# Patient Record
Sex: MALE | Race: WHITE | NOT HISPANIC OR LATINO | Employment: FULL TIME | URBAN - METROPOLITAN AREA
[De-identification: names, ages, dates, MRNs, and addresses within clinical notes are randomized per-mention and may not be internally consistent; named-entity substitution may affect disease eponyms.]

---

## 2022-02-03 ENCOUNTER — OFFICE VISIT (OUTPATIENT)
Dept: GASTROENTEROLOGY | Facility: CLINIC | Age: 52
End: 2022-02-03
Payer: COMMERCIAL

## 2022-02-03 DIAGNOSIS — Z12.11 SCREENING FOR MALIGNANT NEOPLASM OF COLON: ICD-10-CM

## 2022-02-03 DIAGNOSIS — K21.9 GASTROESOPHAGEAL REFLUX DISEASE WITHOUT ESOPHAGITIS: ICD-10-CM

## 2022-02-03 DIAGNOSIS — R10.11 RUQ PAIN: Primary | ICD-10-CM

## 2022-02-03 PROCEDURE — 99203 OFFICE O/P NEW LOW 30 MIN: CPT | Performed by: PHYSICIAN ASSISTANT

## 2022-02-03 NOTE — PROGRESS NOTES
[facility] - Outpatient Consultation  Manny Acosta 46 y o  male MRN: [id number]  Encounter: [id number]          ASSESSMENT AND PLAN:      1  RUQ pain  2  Screening for malignant neoplasm of colon  3  Gastroesophageal reflux disease without esophagitis  -Will plan EGD and colonoscopy with biopsy   -Will add Pepcid to patient's regimen   -Will plan hepatobiliary scan with CCK  -Follow-up in 6 weeks  ______________________________________________________________________    HPI:    70-year-old male presents the office today with a chief complaint of right upper quadrant abdominal pain and GERD  Patient reports he has been suffering with right upper quadrant abdominal pain for the last several weeks  Patient does suffer from chronic acid reflux  Patient has never had endoscopy or colonoscopy in the past   Patient most recently had a right upper quadrant ultrasound that showed no gallbladder pathology  Patient is on PPI therapy at this time  Patient has no alarm symptoms such as melena, hematemesis, unexplained weight loss  REVIEW OF SYSTEMS:    CONSTITUTIONAL: Denies any fever, chills, rigors, and weight loss  HEENT: No earache or tinnitus  Denies hearing loss or visual disturbances  CARDIOVASCULAR: No chest pain or palpitations  RESPIRATORY: Denies any cough, hemoptysis, shortness of breath or dyspnea on exertion  GASTROINTESTINAL: As noted in the History of Present Illness  GENITOURINARY: No problems with urination  Denies any hematuria or dysuria  NEUROLOGIC: No dizziness or vertigo, denies headaches  MUSCULOSKELETAL: Denies any muscle or joint pain  SKIN: Denies skin rashes or itching  ENDOCRINE: Denies excessive thirst  Denies intolerance to heat or cold  PSYCHOSOCIAL: Denies depression or anxiety  Denies any recent memory loss  Historical Information   No past medical history on file  No past surgical history on file    Social History   Social History     Substance and Sexual Activity   Alcohol Use Not on file     Social History     Substance and Sexual Activity   Drug Use Not on file     Social History     Tobacco Use   Smoking Status Not on file   Smokeless Tobacco Not on file     No family history on file  Meds/Allergies     No current outpatient medications on file  Not on File        Objective     There were no vitals taken for this visit  There is no height or weight on file to calculate BMI  PHYSICAL EXAM:      General Appearance:   Alert, cooperative, no distress   HEENT:   Normocephalic, atraumatic, anicteric      Neck:  Supple, symmetrical, trachea midline   Lungs:   Clear to auscultation bilaterally; no rales, rhonchi or wheezing; respirations unlabored    Heart[de-identified]   Regular rate and rhythm; no murmur, rub, or gallop  Abdomen:   Soft, non-tender, non-distended; normal bowel sounds; no masses, no organomegaly    Genitalia:   Deferred    Rectal:   Deferred    Extremities:  No cyanosis, clubbing or edema    Pulses:  2+ and symmetric    Skin:  No jaundice, rashes, or lesions    Lymph nodes:  No palpable cervical lymphadenopathy        Lab Results:   No visits with results within 1 Day(s) from this visit  Latest known visit with results is:   No results found for any previous visit  Radiology Results:   No results found

## 2022-02-03 NOTE — LETTER
February 3, 2022     Jodi Carpenter MD  [facility] [address] [facility]  [address]    Patient: David Sylvester   YOB: 1970   Date of Visit: 2/3/2022       Dear Dr Aldo Taveras: Thank you for referring David Sylvester to me for evaluation  Below are my notes for this consultation  If you have questions, please do not hesitate to call me  I look forward to following your patient along with you  Sincerely,        Ashley Polk PA-C        CC: No Recipients  Mayte Gracia  2/3/2022  5:07 PM  Sign when Signing Visit  [facility] - Outpatient Consultation  David Sylvester 46 y o  male MRN: [id number]  Encounter: [id number]          ASSESSMENT AND PLAN:      1  RUQ pain  2  Screening for malignant neoplasm of colon  3  Gastroesophageal reflux disease without esophagitis  -Will plan EGD and colonoscopy with biopsy   -Will add Pepcid to patient's regimen   -Will plan hepatobiliary scan with CCK  -Follow-up in 6 weeks  ______________________________________________________________________    HPI:    20-year-old male presents the office today with a chief complaint of right upper quadrant abdominal pain and GERD  Patient reports he has been suffering with right upper quadrant abdominal pain for the last several weeks  Patient does suffer from chronic acid reflux  Patient has never had endoscopy or colonoscopy in the past   Patient most recently had a right upper quadrant ultrasound that showed no gallbladder pathology  Patient is on PPI therapy at this time  Patient has no alarm symptoms such as melena, hematemesis, unexplained weight loss  REVIEW OF SYSTEMS:    CONSTITUTIONAL: Denies any fever, chills, rigors, and weight loss  HEENT: No earache or tinnitus  Denies hearing loss or visual disturbances  CARDIOVASCULAR: No chest pain or palpitations  RESPIRATORY: Denies any cough, hemoptysis, shortness of breath or dyspnea on exertion    GASTROINTESTINAL: As noted in the History of Present Illness  GENITOURINARY: No problems with urination  Denies any hematuria or dysuria  NEUROLOGIC: No dizziness or vertigo, denies headaches  MUSCULOSKELETAL: Denies any muscle or joint pain  SKIN: Denies skin rashes or itching  ENDOCRINE: Denies excessive thirst  Denies intolerance to heat or cold  PSYCHOSOCIAL: Denies depression or anxiety  Denies any recent memory loss  Historical Information   No past medical history on file  No past surgical history on file  Social History   Social History     Substance and Sexual Activity   Alcohol Use Not on file     Social History     Substance and Sexual Activity   Drug Use Not on file     Social History     Tobacco Use   Smoking Status Not on file   Smokeless Tobacco Not on file     No family history on file  Meds/Allergies     No current outpatient medications on file  Not on File        Objective     There were no vitals taken for this visit  There is no height or weight on file to calculate BMI  PHYSICAL EXAM:      General Appearance:   Alert, cooperative, no distress   HEENT:   Normocephalic, atraumatic, anicteric      Neck:  Supple, symmetrical, trachea midline   Lungs:   Clear to auscultation bilaterally; no rales, rhonchi or wheezing; respirations unlabored    Heart[de-identified]   Regular rate and rhythm; no murmur, rub, or gallop  Abdomen:   Soft, non-tender, non-distended; normal bowel sounds; no masses, no organomegaly    Genitalia:   Deferred    Rectal:   Deferred    Extremities:  No cyanosis, clubbing or edema    Pulses:  2+ and symmetric    Skin:  No jaundice, rashes, or lesions    Lymph nodes:  No palpable cervical lymphadenopathy        Lab Results:   No visits with results within 1 Day(s) from this visit  Latest known visit with results is:   No results found for any previous visit  Radiology Results:   No results found

## 2022-02-03 NOTE — PATIENT INSTRUCTIONS
Scheduled date of EGD/colonoscopy (as of today):2/22/22  Physician performing EGD/colonoscopy:Ben  Location of EGD/colonoscopy:[facility]  Desired bowel prep reviewed with patient:miralax/dulcolax  Instructions reviewed with patient by:Julia CASTILLO  Clearances:  none  Colonoscopy   AMBULATORY CARE:   What you need to know about a colonoscopy:  A colonoscopy is a procedure to examine the inside of your colon (intestine) with a scope  A scope is a flexible tube with a small light and camera on the end  Polyps or tissue growths may be removed during your colonoscopy  What you need to do the week before your colonoscopy:  Give your healthcare provider a list of all the medicines, supplements, and herbs you take  You will need to stop taking medicines that contain aspirin or iron for 7 days before your colonoscopy  If you take a blood thinner, such as warfarin, ask when you should stop taking it  Make plans for someone to drive you home after your procedure  How to prepare for your colonoscopy: Your healthcare provider will have you prepare your bowels before your procedure  It is important for your bowels to be empty before your procedure to allow him or her to see your colon clearly  You will need to do the following:  · Have only clear liquids for the entire day before your colonoscopy  Clear liquids include plain gelatin, unsweetened fruit juices, clear soup, and broth  Do not drink any liquid that is blue, red, or purple  · Follow your bowel prep as directed  There are many different preparations that can be given before a colonoscopy  With any bowel prep, stay close to the bathroom  This prep will cause your bowels to move often  · Use an enema if directed  Your healthcare provider may tell you to use an enema to help clean out your bowels  · Do not eat or drink anything after midnight  This will help prevent problems that can happen if you vomit while under anesthesia      What will happen during your colonoscopy:   · You will be given medicine to help you relax  You will lie on your left side and raise one or both knees toward your chest  Your healthcare provider will examine your anus and use a gloved finger to check your rectum  You may need another enema if your bowel is not empty  The scope will be lubricated and gently placed into your anus  It will then be passed through your rectum and into your colon  Water or air will be put into your colon to help clean or expand it  This is done so your healthcare provider can see your colon clearly  · Tissue samples may be taken from the walls of your bowel and sent to a lab for tests  If you have a polyp, your healthcare provider will pass a wire loop through the scope and use it to hold the polyp  The polyp is then removed from the wall of your colon  You should not feel this  The polyps are sent to a lab for tests  Pictures of your colon may be taken during the procedure  What will happen after your colonoscopy: You may feel bloated or have some gas and abdominal discomfort  You may need to lie on your left side with a heating pad on your abdomen  Eat small meals until your bloating has improved  Risks of a colonoscopy: You may have pain or bleeding after the scope or polyps are removed  You may also have a slow heartbeat, decreased blood pressure, or increased sweating  Your colon may tear due to the increased pressure from the scope and other instruments  This may cause bowel contents to leak out of your colon and into your abdomen  If this happens, you will need to have surgery on your colon  Seek care immediately if:   · You have a large amount of bright red blood in your bowel movements  · Your abdomen is hard and firm and you have severe pain  · You have sudden trouble breathing  Call your doctor if:   · You develop a rash or hives  · You have a fever within 24 hours of your procedure      · You have not had a bowel movement for 3 days after your procedure  · You have questions or concerns about your condition or care  After your colonoscopy:   · Do not lift, strain, or run  until your healthcare provider says it is okay  · Rest as much as possible  You have been given medicine to relax you  Do not  drive or make important decisions for at least 24 hours  Return to your normal activity as directed  · Relieve gas and discomfort from bloating  by lying on your left side with a heating pad on your abdomen  You may need to take short walks to help the gas move out  Eat small meals until bloating is relieved  If you had polyps removed: For 7 days after your procedure:  · Do not  take aspirin  · Do not  go on long car rides  Help prevent constipation:   · Eat a variety of healthy foods  Healthy foods include fruit, vegetables, whole-grain breads, low-fat dairy products, beans, lean meat, and fish  Ask if you need to be on a special diet  Your healthcare provider may recommend that you eat high-fiber foods such as cooked beans  Fiber helps you have regular bowel movements  · Drink liquids as directed  Adults should drink between 9 and 13 eight-ounce cups of liquid every day  Ask what amount is best for you  For most people, good liquids to drink are water, juice, and milk  · Exercise as directed  Talk to your healthcare provider about the best exercise plan for you  Exercise can help prevent constipation, decrease your blood pressure and improve your health  Follow up with your doctor as directed:  Write down your questions so you remember to ask them during your visits  © Copyright [employer] 2021 Information is for End User's use only and may not be sold, redistributed or otherwise used for commercial purposes  All illustrations and images included in CareNotes® are the copyrighted property of [employer]  or [facility] [employer]   The above information is an  only   It is not intended as medical advice for individual conditions or treatments  Talk to your doctor, nurse or pharmacist before following any medical regimen to see if it is safe and effective for you

## 2022-02-21 ENCOUNTER — TELEPHONE (OUTPATIENT)
Dept: GASTROENTEROLOGY | Facility: HOSPITAL | Age: 52
End: 2022-02-21

## 2022-02-21 ENCOUNTER — TELEPHONE (OUTPATIENT)
Dept: GASTROENTEROLOGY | Facility: CLINIC | Age: 52
End: 2022-02-21

## 2022-02-21 NOTE — TELEPHONE ENCOUNTER
Ben patient - Patient needs a referral for EGD procedure for tomorrow  Can you please put in chart  Patient has Colonoscopy referral from PCP    Thx    Patient's friend Emily Calvo said that patient has a small protrusion around belly button and didn't think patient would tell Dr Debra Randhawa

## 2022-02-22 ENCOUNTER — HOSPITAL ENCOUNTER (OUTPATIENT)
Dept: GASTROENTEROLOGY | Facility: HOSPITAL | Age: 52
Setting detail: OUTPATIENT SURGERY
Discharge: HOME | End: 2022-02-22
Admitting: INTERNAL MEDICINE
Payer: COMMERCIAL

## 2022-02-22 ENCOUNTER — ANESTHESIA EVENT (OUTPATIENT)
Dept: GASTROENTEROLOGY | Facility: HOSPITAL | Age: 52
End: 2022-02-22

## 2022-02-22 ENCOUNTER — ANESTHESIA (OUTPATIENT)
Dept: GASTROENTEROLOGY | Facility: HOSPITAL | Age: 52
End: 2022-02-22

## 2022-02-22 VITALS
WEIGHT: 284.39 LBS | OXYGEN SATURATION: 97 % | DIASTOLIC BLOOD PRESSURE: 85 MMHG | TEMPERATURE: 98.2 F | SYSTOLIC BLOOD PRESSURE: 141 MMHG | HEART RATE: 70 BPM | RESPIRATION RATE: 24 BRPM | HEIGHT: 74 IN | BODY MASS INDEX: 36.5 KG/M2

## 2022-02-22 DIAGNOSIS — Z12.11 SCREENING FOR MALIGNANT NEOPLASM OF COLON: ICD-10-CM

## 2022-02-22 DIAGNOSIS — R10.11 RUQ PAIN: ICD-10-CM

## 2022-02-22 PROCEDURE — 88305 TISSUE EXAM BY PATHOLOGIST: CPT | Performed by: SPECIALIST

## 2022-02-22 PROCEDURE — 45385 COLONOSCOPY W/LESION REMOVAL: CPT | Performed by: INTERNAL MEDICINE

## 2022-02-22 PROCEDURE — 43239 EGD BIOPSY SINGLE/MULTIPLE: CPT | Performed by: INTERNAL MEDICINE

## 2022-02-22 PROCEDURE — 88342 IMHCHEM/IMCYTCHM 1ST ANTB: CPT | Performed by: SPECIALIST

## 2022-02-22 RX ORDER — PROPOFOL 10 MG/ML
INJECTION, EMULSION INTRAVENOUS AS NEEDED
Status: DISCONTINUED | OUTPATIENT
Start: 2022-02-22 | End: 2022-02-22

## 2022-02-22 RX ORDER — GABAPENTIN 800 MG/1
800 TABLET ORAL 3 TIMES DAILY
COMMUNITY
Start: 2022-01-25

## 2022-02-22 RX ORDER — SODIUM CHLORIDE, SODIUM LACTATE, POTASSIUM CHLORIDE, CALCIUM CHLORIDE 600; 310; 30; 20 MG/100ML; MG/100ML; MG/100ML; MG/100ML
INJECTION, SOLUTION INTRAVENOUS CONTINUOUS PRN
Status: DISCONTINUED | OUTPATIENT
Start: 2022-02-22 | End: 2022-02-22

## 2022-02-22 RX ORDER — IBUPROFEN 800 MG/1
800 TABLET ORAL EVERY 8 HOURS PRN
COMMUNITY
Start: 2022-01-24

## 2022-02-22 RX ORDER — DULOXETIN HYDROCHLORIDE 60 MG/1
60 CAPSULE, DELAYED RELEASE ORAL EVERY MORNING
COMMUNITY
Start: 2022-01-25

## 2022-02-22 RX ORDER — SODIUM CHLORIDE, SODIUM LACTATE, POTASSIUM CHLORIDE, CALCIUM CHLORIDE 600; 310; 30; 20 MG/100ML; MG/100ML; MG/100ML; MG/100ML
125 INJECTION, SOLUTION INTRAVENOUS CONTINUOUS
Status: DISCONTINUED | OUTPATIENT
Start: 2022-02-22 | End: 2022-02-26 | Stop reason: HOSPADM

## 2022-02-22 RX ORDER — PANTOPRAZOLE SODIUM 40 MG/1
40 TABLET, DELAYED RELEASE ORAL DAILY
COMMUNITY
Start: 2022-01-28 | End: 2022-03-11 | Stop reason: SDUPTHER

## 2022-02-22 RX ORDER — DULOXETIN HYDROCHLORIDE 30 MG/1
30 CAPSULE, DELAYED RELEASE ORAL DAILY
COMMUNITY
Start: 2022-01-25

## 2022-02-22 RX ORDER — CYCLOBENZAPRINE HYDROCHLORIDE 30 MG/1
30 CAPSULE, EXTENDED RELEASE ORAL DAILY
COMMUNITY
Start: 2022-01-25

## 2022-02-22 RX ORDER — FAMOTIDINE 20 MG/1
20 TABLET, FILM COATED ORAL
COMMUNITY

## 2022-02-22 RX ORDER — LIDOCAINE HYDROCHLORIDE 20 MG/ML
INJECTION, SOLUTION EPIDURAL; INFILTRATION; INTRACAUDAL; PERINEURAL AS NEEDED
Status: DISCONTINUED | OUTPATIENT
Start: 2022-02-22 | End: 2022-02-22

## 2022-02-22 RX ADMIN — PROPOFOL 100 MG: 10 INJECTION, EMULSION INTRAVENOUS at 08:46

## 2022-02-22 RX ADMIN — SODIUM CHLORIDE, SODIUM LACTATE, POTASSIUM CHLORIDE, AND CALCIUM CHLORIDE: .6; .31; .03; .02 INJECTION, SOLUTION INTRAVENOUS at 08:09

## 2022-02-22 RX ADMIN — SODIUM CHLORIDE, SODIUM LACTATE, POTASSIUM CHLORIDE, AND CALCIUM CHLORIDE 125 ML/HR: .6; .31; .03; .02 INJECTION, SOLUTION INTRAVENOUS at 07:29

## 2022-02-22 RX ADMIN — PROPOFOL 100 MG: 10 INJECTION, EMULSION INTRAVENOUS at 08:40

## 2022-02-22 RX ADMIN — PROPOFOL 50 MG: 10 INJECTION, EMULSION INTRAVENOUS at 08:25

## 2022-02-22 RX ADMIN — PROPOFOL 150 MG: 10 INJECTION, EMULSION INTRAVENOUS at 08:23

## 2022-02-22 RX ADMIN — PROPOFOL 150 MG: 10 INJECTION, EMULSION INTRAVENOUS at 08:34

## 2022-02-22 RX ADMIN — LIDOCAINE HYDROCHLORIDE 100 MG: 20 INJECTION, SOLUTION EPIDURAL; INFILTRATION; INTRACAUDAL; PERINEURAL at 08:23

## 2022-02-22 RX ADMIN — PROPOFOL 50 MG: 10 INJECTION, EMULSION INTRAVENOUS at 08:28

## 2022-02-22 NOTE — ANESTHESIA POSTPROCEDURE EVALUATION
Post-Op Assessment Note    CV Status:  Stable  Pain Score: 0    Pain management: adequate     Mental Status:  Sleepy and arousable   Hydration Status:  Euvolemic   PONV Controlled:  Controlled   Airway Patency:  Patent      Post Op Vitals Reviewed: Yes      Staff: Anesthesiologist, CRNA         No complications documented      /87 (02/22/22 0901)    Temp 98 2 °F (36 8 °C) (02/22/22 0901)    Pulse 79 (02/22/22 0901)   Resp 14 (02/22/22 0901)    SpO2 96 % (02/22/22 0901)

## 2022-02-22 NOTE — ANESTHESIA PREPROCEDURE EVALUATION
Procedure:  COLONOSCOPY  EGD    Relevant Problems   No relevant active problems        Physical Exam    Airway    Mallampati score: II  TM Distance: >3 FB  Neck ROM: full     Dental       Cardiovascular  Rhythm: regular, Rate: normal, No murmur, Cardiovascular exam normal    Pulmonary  Pulmonary exam normal Breath sounds clear to auscultation, Breath sounds normal,     Other Findings        Anesthesia Plan  ASA Score- 2     Anesthesia Type- IV sedation with anesthesia with ASA Monitors  Additional Monitors:   Airway Plan:           Plan Factors-Exercise tolerance (METS): >4 METS  Chart reviewed  Existing labs reviewed  Patient is not a current smoker  Patient instructed to abstain from smoking on day of procedure  Patient did not smoke on day of surgery  There is medical exclusion for perioperative obstructive sleep apnea risk education  Induction- intravenous  Postoperative Plan-     Informed Consent- Anesthetic plan and risks discussed with patient  I personally reviewed this patient with the CRNA  Discussed and agreed on the Anesthesia Plan with the CRNA  Ruslan Carlton

## 2022-02-22 NOTE — H&P
History and Physical -  [facility]  Morales Burnham 46 y o  male MRN: [id number]      HPI: Morales Burnham is a 46y o  year old male who presents for evaluation of right upper quadrant abdominal pain, screening colonoscopy, gastroesophageal reflux disease, and rectal bleed      REVIEW OF SYSTEMS: Per the HPI, and otherwise unremarkable  Historical Information   Past Medical History:   Diagnosis Date    Chronic pain disorder     Depression     DVT (deep venous thrombosis) (HCC)     left leg    Lumbar herniated disc      Past Surgical History:   Procedure Laterality Date    BACK SURGERY       Social History   Social History     Substance and Sexual Activity   Alcohol Use Yes    Comment: couple times a year     Social History     Substance and Sexual Activity   Drug Use Never     Social History     Tobacco Use   Smoking Status Former Smoker    Quit date: 2/22/2012    Years since quitting: 10 0   Smokeless Tobacco Never Used     History reviewed  No pertinent family history  Meds/Allergies     (Not in a hospital admission)      Allergies   Allergen Reactions    Penicillins Rash     Not exactly sure -was a child       Objective     Blood pressure 144/90, pulse 78, temperature 98 °F (36 7 °C), temperature source Temporal, resp  rate 17, height 6' 2" (1 88 m), weight 129 kg (284 lb 6 3 oz), SpO2 100 %  PHYSICAL EXAM    Gen: NAD  CV: RRR  CHEST: Clear  ABD: soft, NT/ND  EXT: no edema      ASSESSMENT/PLAN:  This is a 46y o  year old male here for EGD with biopsies, colonoscopy, and he is stable and optimized for his procedure

## 2022-02-28 ENCOUNTER — HOSPITAL ENCOUNTER (OUTPATIENT)
Dept: NUCLEAR MEDICINE | Facility: HOSPITAL | Age: 52
Discharge: HOME | End: 2022-02-28
Payer: COMMERCIAL

## 2022-02-28 ENCOUNTER — TREATMENT (OUTPATIENT)
Dept: GASTROENTEROLOGY | Facility: CLINIC | Age: 52
End: 2022-02-28

## 2022-02-28 DIAGNOSIS — R89.7 ABNORMALITY PRESENT ON GROSS PATHOLOGY: Primary | ICD-10-CM

## 2022-02-28 DIAGNOSIS — R10.11 RUQ PAIN: ICD-10-CM

## 2022-02-28 PROCEDURE — G1004 CDSM NDSC: HCPCS

## 2022-02-28 PROCEDURE — A9537 TC99M MEBROFENIN: HCPCS

## 2022-02-28 PROCEDURE — 78227 HEPATOBIL SYST IMAGE W/DRUG: CPT

## 2022-02-28 RX ADMIN — SINCALIDE 2.6 MCG: 5 INJECTION, POWDER, LYOPHILIZED, FOR SOLUTION INTRAVENOUS at 14:20

## 2022-03-01 ENCOUNTER — TELEPHONE (OUTPATIENT)
Dept: GASTROENTEROLOGY | Facility: CLINIC | Age: 52
End: 2022-03-01

## 2022-03-01 ENCOUNTER — APPOINTMENT (OUTPATIENT)
Dept: LAB | Facility: HOSPITAL | Age: 52
End: 2022-03-01
Payer: COMMERCIAL

## 2022-03-01 DIAGNOSIS — R89.7 ABNORMALITY PRESENT ON GROSS PATHOLOGY: ICD-10-CM

## 2022-03-01 PROCEDURE — 36415 COLL VENOUS BLD VENIPUNCTURE: CPT

## 2022-03-01 PROCEDURE — 86364 TISS TRNSGLTMNASE EA IG CLAS: CPT

## 2022-03-01 NOTE — TELEPHONE ENCOUNTER
Sade Gutierrez DO  [facility]  Cc: Charlene Hence  The please call the patient with the biopsy results   Biopsies the small intestine did show increased white blood cells which is a nonspecific finding   The patient will need undergo a blood test which is a tTG, IgA   Please ask the patient to go and have the blood test done     He also had biopsies of the stomach obtained which were negative for Helicobacter pylori however the overall features on the tissue sample were suspicious for Helicobacter pylori   Therefore I am requesting a stool sample be done for Helicobacter pylori antigen  Both the blood test and Helicobacter pylori stool test have been ordered       Colon polyps were precancerous polyps and this patient had both polyps completely removed and he will need his next colonoscopy performed in 3 years

## 2022-03-01 NOTE — TELEPHONE ENCOUNTER
Called pt and advised results and of bw and stool tests  Pt wanted to know why he has to see a surgeon    advised results of hyda scan    Pt voiced understanding

## 2022-03-01 NOTE — TELEPHONE ENCOUNTER
The pt was given the results and advised of the bw and stool test   Pt voiced understanding  Pt questioned why he has to see a surgeon    advised pt of the hyda tests and pt voiced understanding

## 2022-03-01 NOTE — TELEPHONE ENCOUNTER
----- Message from Sera Boateng DO sent at 2/28/2022  5:29 PM EST -----  The please call the patient with the biopsy results  Biopsies the small intestine did show increased white blood cells which is a nonspecific finding  The patient will need undergo a blood test which is a tTG, IgA  Please ask the patient to go and have the blood test done    He also had biopsies of the stomach obtained which were negative for Helicobacter pylori however the overall features on the tissue sample were suspicious for Helicobacter pylori  Therefore I am requesting a stool sample be done for Helicobacter pylori antigen  Both the blood test and Helicobacter pylori stool test have been ordered      Colon polyps were precancerous polyps and this patient had both polyps completely removed and he will need his next colonoscopy performed in 3 years

## 2022-03-01 NOTE — TELEPHONE ENCOUNTER
Artie Salvador called to discuss the results of the colon  EGD and the Hepatobiliary    Please call Washington Richards at [phone]

## 2022-03-01 NOTE — TELEPHONE ENCOUNTER
----- Message from Truitt Heimlich, PA-C sent at 3/1/2022  9:35 AM EST -----  Please let patient know that this was an abnormal test   His gallbladder did not contracted all which is consistent with biliary dyskinesia  The treatment for this assuming it is the source of his symptoms would be gallbladder removal   I did place an order for surgical evaluation  It seems as though he has several other things going on as well including acid reflux, H pylori and workup be initiated for celiac disease  It is probably prudent for him to have this other testing and follow up with Jannette Julien in the office sooner than March 26th before going for surgical consultation  There is no emergency to seeing a surgeon

## 2022-03-01 NOTE — TELEPHONE ENCOUNTER
Ben pt-  Patient is returning someone's phone call? No reason provided     Please phone [phone]

## 2022-03-02 ENCOUNTER — APPOINTMENT (OUTPATIENT)
Dept: LAB | Facility: HOSPITAL | Age: 52
End: 2022-03-02
Payer: COMMERCIAL

## 2022-03-02 DIAGNOSIS — R89.7 ABNORMALITY PRESENT ON GROSS PATHOLOGY: ICD-10-CM

## 2022-03-02 PROCEDURE — 87338 HPYLORI STOOL AG IA: CPT

## 2022-03-03 ENCOUNTER — TELEPHONE (OUTPATIENT)
Dept: GASTROENTEROLOGY | Facility: CLINIC | Age: 52
End: 2022-03-03

## 2022-03-03 LAB — TTG IGA SER-ACNC: <2 U/ML (ref 0–3)

## 2022-03-03 NOTE — TELEPHONE ENCOUNTER
----- Message from Clemencia Guajardo DO sent at 3/3/2022  9:41 AM EST -----  Please call the patient and make him aware that his blood test for celiac disease was negative

## 2022-03-04 LAB — H PYLORI AG STL QL IA: NEGATIVE

## 2022-03-08 ENCOUNTER — TELEPHONE (OUTPATIENT)
Dept: GASTROENTEROLOGY | Facility: CLINIC | Age: 52
End: 2022-03-08

## 2022-03-08 NOTE — TELEPHONE ENCOUNTER
Called and spoke to patient  Gave patient test results  Patient voiced understanding and had no further questions or concerns

## 2022-03-08 NOTE — TELEPHONE ENCOUNTER
----- Message from Sav Chinchilla DO sent at 3/7/2022  6:09 PM EST -----  Please call the patient with the Helicobacter pylori results  The Helicobacter pylori was negative

## 2022-03-09 ENCOUNTER — CONSULT (OUTPATIENT)
Dept: SURGERY | Facility: CLINIC | Age: 52
End: 2022-03-09
Payer: COMMERCIAL

## 2022-03-09 ENCOUNTER — OFFICE VISIT (OUTPATIENT)
Dept: LAB | Facility: HOSPITAL | Age: 52
End: 2022-03-09
Payer: COMMERCIAL

## 2022-03-09 ENCOUNTER — APPOINTMENT (OUTPATIENT)
Dept: LAB | Facility: HOSPITAL | Age: 52
End: 2022-03-09
Payer: COMMERCIAL

## 2022-03-09 VITALS
HEIGHT: 74 IN | DIASTOLIC BLOOD PRESSURE: 86 MMHG | BODY MASS INDEX: 36.57 KG/M2 | WEIGHT: 285 LBS | TEMPERATURE: 98 F | OXYGEN SATURATION: 98 % | HEART RATE: 62 BPM | RESPIRATION RATE: 16 BRPM | SYSTOLIC BLOOD PRESSURE: 140 MMHG

## 2022-03-09 DIAGNOSIS — K82.8 BILIARY DYSKINESIA: Primary | ICD-10-CM

## 2022-03-09 DIAGNOSIS — K42.9 UMBILICAL HERNIA WITHOUT OBSTRUCTION AND WITHOUT GANGRENE: ICD-10-CM

## 2022-03-09 DIAGNOSIS — K82.8 BILIARY DYSKINESIA: ICD-10-CM

## 2022-03-09 DIAGNOSIS — E66.01 OBESITY, MORBID (HCC): ICD-10-CM

## 2022-03-09 LAB
ALBUMIN SERPL BCP-MCNC: 4.1 G/DL (ref 3.5–5)
ALP SERPL-CCNC: 86 U/L (ref 46–116)
ALT SERPL W P-5'-P-CCNC: 30 U/L (ref 12–78)
ANION GAP SERPL CALCULATED.3IONS-SCNC: 6 MMOL/L (ref 4–13)
AST SERPL W P-5'-P-CCNC: 25 U/L (ref 5–45)
ATRIAL RATE: 71 BPM
BILIRUB SERPL-MCNC: 0.44 MG/DL (ref 0.2–1)
BUN SERPL-MCNC: 18 MG/DL (ref 5–25)
CALCIUM SERPL-MCNC: 9.1 MG/DL (ref 8.3–10.1)
CHLORIDE SERPL-SCNC: 104 MMOL/L (ref 100–108)
CO2 SERPL-SCNC: 31 MMOL/L (ref 21–32)
CREAT SERPL-MCNC: 1.17 MG/DL (ref 0.6–1.3)
GFR SERPL CREATININE-BSD FRML MDRD: 71 ML/MIN/1.73SQ M
GLUCOSE SERPL-MCNC: 90 MG/DL (ref 65–140)
P AXIS: 34 DEGREES
POTASSIUM SERPL-SCNC: 3.9 MMOL/L (ref 3.5–5.3)
PR INTERVAL: 164 MS
PROT SERPL-MCNC: 7.3 G/DL (ref 6.4–8.2)
QRS AXIS: -3 DEGREES
QRSD INTERVAL: 98 MS
QT INTERVAL: 390 MS
QTC INTERVAL: 423 MS
SODIUM SERPL-SCNC: 141 MMOL/L (ref 136–145)
T WAVE AXIS: 22 DEGREES
VENTRICULAR RATE: 71 BPM

## 2022-03-09 PROCEDURE — 93010 ELECTROCARDIOGRAM REPORT: CPT | Performed by: INTERNAL MEDICINE

## 2022-03-09 PROCEDURE — 93005 ELECTROCARDIOGRAM TRACING: CPT

## 2022-03-09 PROCEDURE — 80053 COMPREHEN METABOLIC PANEL: CPT

## 2022-03-09 PROCEDURE — 36415 COLL VENOUS BLD VENIPUNCTURE: CPT

## 2022-03-09 PROCEDURE — 99204 OFFICE O/P NEW MOD 45 MIN: CPT | Performed by: SURGERY

## 2022-03-09 NOTE — H&P (VIEW-ONLY)
Assessment/Plan:     1  Biliary dyskinesia  -     Ambulatory Referral to General Surgery  -     Case request operating room: CHOLECYSTECTOMY LAPAROSCOPIC; Standing  -     Comprehensive metabolic panel; Future  -     EKG 12 lead; Future  -     Case request operating room: CHOLECYSTECTOMY LAPAROSCOPIC    2  Obesity, morbid ([address] )    3  Umbilical hernia without obstruction and without gangrene       The risks and benefits of cholecystectomy were discussed and the plan for laparoscopic cholecystectomy was outlined with the use a picture for visual aid  Well close the hernia with suture but he would need a definite hernia repair in the future with mesh if he desires; he is not having symptoms now  We discussed the risks not limited to bleeding, infection, bile duct injury and reactions to anesthetic medications  We discussed the anticipated approach and incisions and the anticipated postoperative course  Patient's questions were answered and a consent form was signed  Subjective:      Patient ID: Mauricio Torres is a 46 y o  male  Triage Notes:    Mr Romero is presenting with right upper quadrant pain for the past several months  Also noticed his belly button protrudes  No abdominal surgery history  Does occasionally notice some blood in his stool  Does have cauda equina after his back surgery which was a work related injury  The following portions of the patient's history were reviewed and updated as appropriate:   He  has a past medical history of Chronic pain disorder, Depression, DVT (deep venous thrombosis) ([address] ), and Lumbar herniated disc  He   Patient Active Problem List    Diagnosis Date Noted    Obesity, morbid ([address] ) 03/09/2022     He  has a past surgical history that includes Back surgery  His family history is not on file  He  reports that he quit smoking about 10 years ago  He has never used smokeless tobacco  He reports current alcohol use  He reports that he does not use drugs    Current Outpatient Medications on File Prior to Visit   Medication Sig    cyclobenzaprine (AMRIX) 30 MG 24 hr capsule Take 30 mg by mouth daily    DULoxetine (CYMBALTA) 30 mg delayed release capsule Take 30 mg by mouth daily    DULoxetine (CYMBALTA) 60 mg delayed release capsule Take 60 mg by mouth every morning    famotidine (PEPCID) 20 mg tablet Take 20 mg by mouth 2 (two) times a day    gabapentin (NEURONTIN) 800 mg tablet Take 800 mg by mouth 3 (three) times a day    ibuprofen (MOTRIN) 800 mg tablet Take 800 mg by mouth every 8 (eight) hours as needed    pantoprazole (PROTONIX) 40 mg tablet Take 40 mg by mouth daily     No current facility-administered medications on file prior to visit  He is allergic to penicillins       Review of Systems      Objective:      /86   Pulse 62   Temp 98 °F (36 7 °C) (Temporal)   Resp 16   Ht 6' 2" (1 88 m)   Wt 129 kg (285 lb)   SpO2 98%   BMI 36 59 kg/m²     Below is the patient's most recent value for Albumin, ALT, AST, BUN, Calcium, Chloride, Cholesterol, CO2, Creatinine, GFR, Glucose, HDL, Hematocrit, Hemoglobin, Hemoglobin A1C, LDL, Magnesium, Phosphorus, Platelets, Potassium, PSA, Sodium, Triglycerides, and WBC  Lab Results   Component Value Date    ALT 30 03/09/2022    AST 25 03/09/2022    BUN 18 03/09/2022    CALCIUM 9 1 03/09/2022     03/09/2022    CO2 31 03/09/2022    CREATININE 1 17 03/09/2022    HGBA1C 5 6 02/02/2022    K 3 9 03/09/2022     Note: for a comprehensive list of the patient's lab results, access the Results Review activity  Physical Exam  Vitals and nursing note reviewed  Constitutional:       General: He is not in acute distress  Appearance: He is well-developed  He is obese  HENT:      Head: Normocephalic and atraumatic  Eyes:      General:         Right eye: No discharge  Left eye: No discharge  Neck:      Vascular: No JVD  Cardiovascular:      Rate and Rhythm: Normal rate and regular rhythm     Pulmonary: Effort: Pulmonary effort is normal       Breath sounds: Normal breath sounds  Abdominal:      General: There is no distension  Palpations: Abdomen is soft  Tenderness: There is no abdominal tenderness  There is no guarding  Hernia: A hernia (partially reducible fat containing umbilical hernia) is present  Comments: Negative murphys   Musculoskeletal:         General: Normal range of motion  Cervical back: Normal range of motion and neck supple  Skin:     General: Skin is warm and dry  Neurological:      Mental Status: He is alert and oriented to person, place, and time  Psychiatric:         Mood and Affect: Mood normal          Behavior: Behavior normal          Thought Content:  Thought content normal          Judgment: Judgment normal              Procedures

## 2022-03-09 NOTE — PROGRESS NOTES
Assessment/Plan:     1  Biliary dyskinesia  -     Ambulatory Referral to General Surgery  -     Case request operating room: CHOLECYSTECTOMY LAPAROSCOPIC; Standing  -     Comprehensive metabolic panel; Future  -     EKG 12 lead; Future  -     Case request operating room: CHOLECYSTECTOMY LAPAROSCOPIC    2  Ob[address], morbid [address] [address]    3  Umbilical hernia without obstruction and without gangrene       The risks and benefits of cholecystectomy were discussed and the plan for laparoscopic cholecystectomy was outlined with the use a picture for visual aid  Well close the hernia with suture but he would need a definite hernia repair in the future with mesh if he desires; he is not having symptoms now  We discussed the risks not limited to bleeding, infection, bile duct injury and reactions to anesthetic medications  We discussed the anticipated approach and incisions and the anticipated postoperative course  Patient's questions were answered and a consent form was signed  Subjective:      Patient ID: Army De Los Santos is a 46 y o  male  Triage Notes:    Mr Romero is presenting with right upper quadrant pain for the past several months  Also noticed his belly button protrudes  No abdominal surgery history  Does occasionally notice some blood in his stool  Does have cauda equina after his back surgery which was a work related injury  The following portions of the patient's history were reviewed and updated as appropriate:   He  has a past medical history of Chronic pain disorder, Depression, DVT (deep venous thrombosis) ([address] ), and Lumbar herniated disc  He   Patient Active Problem List    Diagnosis Date Noted    Obesity, morbid ([address] ) 03/09/2022     He  has a past surgical history that includes Back surgery  His family history is not on file  He  reports that he quit smoking about 10 years ago  He has never used smokeless tobacco  He reports current alcohol use  He reports that he does not use drugs    Current Outpatient Medications on File Prior to Visit   Medication Sig    cyclobenzaprine (AMRIX) 30 MG 24 hr capsule Take 30 mg by mouth daily    DULoxetine (CYMBALTA) 30 mg delayed release capsule Take 30 mg by mouth daily    DULoxetine (CYMBALTA) 60 mg delayed release capsule Take 60 mg by mouth every morning    famotidine (PEPCID) 20 mg tablet Take 20 mg by mouth 2 (two) times a day    gabapentin (NEURONTIN) 800 mg tablet Take 800 mg by mouth 3 (three) times a day    ibuprofen (MOTRIN) 800 mg tablet Take 800 mg by mouth every 8 (eight) hours as needed    pantoprazole (PROTONIX) 40 mg tablet Take 40 mg by mouth daily     No current facility-administered medications on file prior to visit  He is allergic to penicillins       Review of Systems      Objective:      /86   Pulse 62   Temp 98 °F (36 7 °C) (Temporal)   Resp 16   Ht 6' 2" (1 88 m)   Wt 129 kg (285 lb)   SpO2 98%   BMI 36 59 kg/m²     Below is the patient's most recent value for Albumin, ALT, AST, BUN, Calcium, Chloride, Cholesterol, CO2, Creatinine, GFR, Glucose, HDL, Hematocrit, Hemoglobin, Hemoglobin A1C, LDL, Magnesium, Phosphorus, Platelets, Potassium, PSA, Sodium, Triglycerides, and WBC  Lab Results   Component Value Date    ALT 30 03/09/2022    AST 25 03/09/2022    BUN 18 03/09/2022    CALCIUM 9 1 03/09/2022     03/09/2022    CO2 31 03/09/2022    CREATININE 1 17 03/09/2022    HGBA1C 5 6 02/02/2022    K 3 9 03/09/2022     Note: for a comprehensive list of the patient's lab results, access the Results Review activity  Physical Exam  Vitals and nursing note reviewed  Constitutional:       General: He is not in acute distress  Appearance: He is well-developed  He is obese  HENT:      Head: Normocephalic and atraumatic  Eyes:      General:         Right eye: No discharge  Left eye: No discharge  Neck:      Vascular: No JVD  Cardiovascular:      Rate and Rhythm: Normal rate and regular rhythm     Pulmonary: Effort: Pulmonary effort is normal       Breath sounds: Normal breath sounds  Abdominal:      General: There is no distension  Palpations: Abdomen is soft  Tenderness: There is no abdominal tenderness  There is no guarding  Hernia: A hernia (partially reducible fat containing umbilical hernia) is present  Comments: Negative murphys   Musculoskeletal:         General: Normal range of motion  Cervical back: Normal range of motion and neck supple  Skin:     General: Skin is warm and dry  Neurological:      Mental Status: He is alert and oriented to person, place, and time  Psychiatric:         Mood and Affect: Mood normal          Behavior: Behavior normal          Thought Content:  Thought content normal          Judgment: Judgment normal              Procedures

## 2022-03-18 RX ORDER — FERROUS SULFATE 325(65) MG
325 TABLET ORAL EVERY EVENING
COMMUNITY

## 2022-03-18 NOTE — PRE-PROCEDURE INSTRUCTIONS
Pre-Surgery Instructions:   Medication Instructions    cyclobenzaprine (AMRIX) 30 MG 24 hr capsule Instructed patient per Anesthesia Guidelines   DULoxetine (CYMBALTA) 30 mg delayed release capsule Instructed patient per Anesthesia Guidelines   DULoxetine (CYMBALTA) 60 mg delayed release capsule Instructed patient per Anesthesia Guidelines   famotidine (PEPCID) 20 mg tablet Instructed patient per Anesthesia Guidelines   ferrous sulfate 325 (65 Fe) mg tablet Patient was instructed by Physician and understands  PCP started pt on this for "low iron"    gabapentin (NEURONTIN) 800 mg tablet Instructed patient per Anesthesia Guidelines   ibuprofen (MOTRIN) 800 mg tablet Instructed patient per Anesthesia Guidelines   pantoprazole (PROTONIX) 40 mg tablet Instructed patient per Anesthesia Guidelines  Patient  instructed *to take*gabapentin, pantoprazole and duloxetine*with a sip of water the morning of surgery  Patient  instructed on use of chlorhexidine soap per hospital protocol    Patient instructed to stop all ASA, NSAIDS(at least 3 days), vitamins and herbal supplements from now to surgery or per Dr Meli Aguero

## 2022-03-21 ENCOUNTER — ANESTHESIA EVENT (OUTPATIENT)
Dept: PERIOP | Facility: HOSPITAL | Age: 52
End: 2022-03-21
Payer: COMMERCIAL

## 2022-03-22 PROBLEM — K21.9 GASTROESOPHAGEAL REFLUX DISEASE: Status: ACTIVE | Noted: 2022-03-22

## 2022-03-22 PROBLEM — F32.A DEPRESSION: Status: ACTIVE | Noted: 2022-03-22

## 2022-03-22 NOTE — ANESTHESIA PREPROCEDURE EVALUATION
Procedure:  CHOLECYSTECTOMY LAPAROSCOPIC (N/A Abdomen)    Relevant Problems   GI/HEPATIC   (+) Gastroesophageal reflux disease      HEMATOLOGY  +DVT      NEURO/PSYCH  Left foot drop   (+) Depression     No results found for: WBC, HGB, HCT, MCV, PLT  Lab Results   Component Value Date    CALCIUM 9 1 03/09/2022    K 3 9 03/09/2022    CO2 31 03/09/2022     03/09/2022    BUN 18 03/09/2022    CREATININE 1 17 03/09/2022     EKG 3/9/2022: SR 71 bpm        Physical Exam    Airway    Mallampati score: I  TM Distance: >3 FB  Neck ROM: full     Dental   Comment: Missing teeth in back,     Cardiovascular  Rhythm: regular, Rate: normal,     Pulmonary  Comment: Speaking in full sentences, normal work of breathing, not tachypneic, Pulmonary exam normal     Other Findings  Unable to dorsiflex L foot      Anesthesia Plan  ASA Score- 2     Anesthesia Type- general with ASA Monitors  Additional Monitors:   Airway Plan: ETT  Plan Factors-Exercise tolerance (METS): >4 METS  Chart reviewed  Patient is not a current smoker  Obstructive sleep apnea risk education given perioperatively  Induction- intravenous  Postoperative Plan- Plan for postoperative opioid use  Informed Consent- Anesthetic plan and risks discussed with patient  I personally reviewed this patient with the CRNA  Discussed and agreed on the Anesthesia Plan with the CRNA  Joice Severin MD, personally examined the patient, reviewed the patient history and laboratory data, and explained the risks/benefits of the anesthetic to the patient  The patient has signed the appropriate consents and is ready to proceed

## 2022-03-23 ENCOUNTER — HOSPITAL ENCOUNTER (OUTPATIENT)
Facility: HOSPITAL | Age: 52
Setting detail: OUTPATIENT SURGERY
Discharge: HOME | End: 2022-03-23
Attending: SURGERY | Admitting: SURGERY
Payer: COMMERCIAL

## 2022-03-23 ENCOUNTER — ANESTHESIA (OUTPATIENT)
Dept: PERIOP | Facility: HOSPITAL | Age: 52
End: 2022-03-23
Payer: COMMERCIAL

## 2022-03-23 VITALS
OXYGEN SATURATION: 95 % | SYSTOLIC BLOOD PRESSURE: 127 MMHG | TEMPERATURE: 98.5 F | HEART RATE: 80 BPM | DIASTOLIC BLOOD PRESSURE: 68 MMHG | HEIGHT: 74 IN | RESPIRATION RATE: 17 BRPM | BODY MASS INDEX: 36.95 KG/M2 | WEIGHT: 287.92 LBS

## 2022-03-23 DIAGNOSIS — K82.8 BILIARY DYSKINESIA: ICD-10-CM

## 2022-03-23 PROCEDURE — 88304 TISSUE EXAM BY PATHOLOGIST: CPT | Performed by: PATHOLOGY

## 2022-03-23 PROCEDURE — NC001 PR NO CHARGE

## 2022-03-23 PROCEDURE — 47562 LAPAROSCOPIC CHOLECYSTECTOMY: CPT | Performed by: SURGERY

## 2022-03-23 PROCEDURE — 47562 LAPAROSCOPIC CHOLECYSTECTOMY: CPT

## 2022-03-23 RX ORDER — PROMETHAZINE HYDROCHLORIDE 25 MG/ML
12.5 INJECTION, SOLUTION INTRAMUSCULAR; INTRAVENOUS ONCE AS NEEDED
Status: DISCONTINUED | OUTPATIENT
Start: 2022-03-23 | End: 2022-03-23 | Stop reason: HOSPADM

## 2022-03-23 RX ORDER — DOCUSATE SODIUM 100 MG/1
100 CAPSULE, LIQUID FILLED ORAL 2 TIMES DAILY
Qty: 10 CAPSULE | Refills: 0 | Status: SHIPPED | OUTPATIENT
Start: 2022-03-23

## 2022-03-23 RX ORDER — MAGNESIUM HYDROXIDE 1200 MG/15ML
LIQUID ORAL AS NEEDED
Status: DISCONTINUED | OUTPATIENT
Start: 2022-03-23 | End: 2022-03-23 | Stop reason: HOSPADM

## 2022-03-23 RX ORDER — ONDANSETRON 2 MG/ML
4 INJECTION INTRAMUSCULAR; INTRAVENOUS ONCE AS NEEDED
Status: DISCONTINUED | OUTPATIENT
Start: 2022-03-23 | End: 2022-03-23 | Stop reason: HOSPADM

## 2022-03-23 RX ORDER — HYDROCODONE BITARTRATE AND ACETAMINOPHEN 5; 325 MG/1; MG/1
1 TABLET ORAL EVERY 6 HOURS PRN
Qty: 12 TABLET | Refills: 0 | Status: SHIPPED | OUTPATIENT
Start: 2022-03-23 | End: 2022-04-02

## 2022-03-23 RX ORDER — DEXAMETHASONE SODIUM PHOSPHATE 10 MG/ML
INJECTION, SOLUTION INTRAMUSCULAR; INTRAVENOUS AS NEEDED
Status: DISCONTINUED | OUTPATIENT
Start: 2022-03-23 | End: 2022-03-23

## 2022-03-23 RX ORDER — HYDROMORPHONE HYDROCHLORIDE 2 MG/ML
INJECTION, SOLUTION INTRAMUSCULAR; INTRAVENOUS; SUBCUTANEOUS AS NEEDED
Status: DISCONTINUED | OUTPATIENT
Start: 2022-03-23 | End: 2022-03-23

## 2022-03-23 RX ORDER — MIDAZOLAM HYDROCHLORIDE 2 MG/2ML
INJECTION, SOLUTION INTRAMUSCULAR; INTRAVENOUS AS NEEDED
Status: DISCONTINUED | OUTPATIENT
Start: 2022-03-23 | End: 2022-03-23

## 2022-03-23 RX ORDER — HYDROMORPHONE HCL/PF 1 MG/ML
0.5 SYRINGE (ML) INJECTION
Status: COMPLETED | OUTPATIENT
Start: 2022-03-23 | End: 2022-03-23

## 2022-03-23 RX ORDER — NEOSTIGMINE METHYLSULFATE 1 MG/ML
INJECTION INTRAVENOUS AS NEEDED
Status: DISCONTINUED | OUTPATIENT
Start: 2022-03-23 | End: 2022-03-23

## 2022-03-23 RX ORDER — CLINDAMYCIN PHOSPHATE 900 MG/50ML
900 INJECTION INTRAVENOUS ONCE
Status: COMPLETED | OUTPATIENT
Start: 2022-03-23 | End: 2022-03-23

## 2022-03-23 RX ORDER — SODIUM CHLORIDE, SODIUM LACTATE, POTASSIUM CHLORIDE, CALCIUM CHLORIDE 600; 310; 30; 20 MG/100ML; MG/100ML; MG/100ML; MG/100ML
125 INJECTION, SOLUTION INTRAVENOUS CONTINUOUS
Status: DISCONTINUED | OUTPATIENT
Start: 2022-03-23 | End: 2022-03-23 | Stop reason: HOSPADM

## 2022-03-23 RX ORDER — HYDROCODONE BITARTRATE AND ACETAMINOPHEN 5; 325 MG/1; MG/1
1 TABLET ORAL EVERY 6 HOURS PRN
Status: DISCONTINUED | OUTPATIENT
Start: 2022-03-23 | End: 2022-03-23 | Stop reason: HOSPADM

## 2022-03-23 RX ORDER — FENTANYL CITRATE/PF 50 MCG/ML
25 SYRINGE (ML) INJECTION
Status: DISCONTINUED | OUTPATIENT
Start: 2022-03-23 | End: 2022-03-23 | Stop reason: HOSPADM

## 2022-03-23 RX ORDER — GLYCOPYRROLATE 0.2 MG/ML
INJECTION INTRAMUSCULAR; INTRAVENOUS AS NEEDED
Status: DISCONTINUED | OUTPATIENT
Start: 2022-03-23 | End: 2022-03-23

## 2022-03-23 RX ORDER — LIDOCAINE HYDROCHLORIDE 10 MG/ML
0.5 INJECTION, SOLUTION EPIDURAL; INFILTRATION; INTRACAUDAL; PERINEURAL ONCE AS NEEDED
Status: DISCONTINUED | OUTPATIENT
Start: 2022-03-23 | End: 2022-03-23 | Stop reason: HOSPADM

## 2022-03-23 RX ORDER — HYDROCODONE BITARTRATE AND ACETAMINOPHEN 5; 325 MG/1; MG/1
1 TABLET ORAL EVERY 6 HOURS PRN
Qty: 12 TABLET | Refills: 0 | Status: SHIPPED | OUTPATIENT
Start: 2022-03-23 | End: 2022-03-23 | Stop reason: SDUPTHER

## 2022-03-23 RX ORDER — PROPOFOL 10 MG/ML
INJECTION, EMULSION INTRAVENOUS AS NEEDED
Status: DISCONTINUED | OUTPATIENT
Start: 2022-03-23 | End: 2022-03-23

## 2022-03-23 RX ORDER — ROCURONIUM BROMIDE 10 MG/ML
INJECTION, SOLUTION INTRAVENOUS AS NEEDED
Status: DISCONTINUED | OUTPATIENT
Start: 2022-03-23 | End: 2022-03-23

## 2022-03-23 RX ORDER — LIDOCAINE HYDROCHLORIDE 10 MG/ML
INJECTION, SOLUTION EPIDURAL; INFILTRATION; INTRACAUDAL; PERINEURAL AS NEEDED
Status: DISCONTINUED | OUTPATIENT
Start: 2022-03-23 | End: 2022-03-23

## 2022-03-23 RX ORDER — SUCCINYLCHOLINE/SOD CL,ISO/PF 100 MG/5ML
SYRINGE (ML) INTRAVENOUS AS NEEDED
Status: DISCONTINUED | OUTPATIENT
Start: 2022-03-23 | End: 2022-03-23

## 2022-03-23 RX ORDER — FENTANYL CITRATE 50 UG/ML
INJECTION, SOLUTION INTRAMUSCULAR; INTRAVENOUS AS NEEDED
Status: DISCONTINUED | OUTPATIENT
Start: 2022-03-23 | End: 2022-03-23

## 2022-03-23 RX ADMIN — MIDAZOLAM HYDROCHLORIDE 2 MG: 1 INJECTION, SOLUTION INTRAMUSCULAR; INTRAVENOUS at 10:19

## 2022-03-23 RX ADMIN — HYDROMORPHONE HYDROCHLORIDE 0.5 MG: 1 INJECTION, SOLUTION INTRAMUSCULAR; INTRAVENOUS; SUBCUTANEOUS at 12:16

## 2022-03-23 RX ADMIN — PROPOFOL 200 MG: 10 INJECTION, EMULSION INTRAVENOUS at 10:28

## 2022-03-23 RX ADMIN — GLYCOPYRROLATE 0.2 MG: 0.2 INJECTION, SOLUTION INTRAMUSCULAR; INTRAVENOUS at 11:16

## 2022-03-23 RX ADMIN — SODIUM CHLORIDE, SODIUM LACTATE, POTASSIUM CHLORIDE, AND CALCIUM CHLORIDE 125 ML/HR: .6; .31; .03; .02 INJECTION, SOLUTION INTRAVENOUS at 09:02

## 2022-03-23 RX ADMIN — HYDROMORPHONE HYDROCHLORIDE 0.5 MG: 2 INJECTION, SOLUTION INTRAMUSCULAR; INTRAVENOUS; SUBCUTANEOUS at 10:52

## 2022-03-23 RX ADMIN — LIDOCAINE HYDROCHLORIDE 50 MG: 10 INJECTION, SOLUTION EPIDURAL; INFILTRATION; INTRACAUDAL; PERINEURAL at 10:28

## 2022-03-23 RX ADMIN — GLYCOPYRROLATE 0.2 MG: 0.2 INJECTION, SOLUTION INTRAMUSCULAR; INTRAVENOUS at 11:22

## 2022-03-23 RX ADMIN — HYDROMORPHONE HYDROCHLORIDE 1 MG: 2 INJECTION, SOLUTION INTRAMUSCULAR; INTRAVENOUS; SUBCUTANEOUS at 11:41

## 2022-03-23 RX ADMIN — NEOSTIGMINE METHYLSULFATE 1 MG: 1 INJECTION INTRAVENOUS at 11:17

## 2022-03-23 RX ADMIN — CLINDAMYCIN PHOSPHATE 900 MG: 900 INJECTION, SOLUTION INTRAVENOUS at 10:19

## 2022-03-23 RX ADMIN — HYDROMORPHONE HYDROCHLORIDE 0.5 MG: 2 INJECTION, SOLUTION INTRAMUSCULAR; INTRAVENOUS; SUBCUTANEOUS at 11:30

## 2022-03-23 RX ADMIN — HYDROMORPHONE HYDROCHLORIDE 0.5 MG: 1 INJECTION, SOLUTION INTRAMUSCULAR; INTRAVENOUS; SUBCUTANEOUS at 12:04

## 2022-03-23 RX ADMIN — NEOSTIGMINE METHYLSULFATE 1 MG: 1 INJECTION INTRAVENOUS at 11:24

## 2022-03-23 RX ADMIN — FENTANYL CITRATE 50 MCG: 50 INJECTION, SOLUTION INTRAMUSCULAR; INTRAVENOUS at 10:47

## 2022-03-23 RX ADMIN — GLYCOPYRROLATE 0.4 MG: 0.2 INJECTION, SOLUTION INTRAMUSCULAR; INTRAVENOUS at 11:19

## 2022-03-23 RX ADMIN — NEOSTIGMINE METHYLSULFATE 2 MG: 1 INJECTION INTRAVENOUS at 11:20

## 2022-03-23 RX ADMIN — ROCURONIUM BROMIDE 50 MG: 10 INJECTION, SOLUTION INTRAVENOUS at 10:29

## 2022-03-23 RX ADMIN — Medication 100 MG: at 10:31

## 2022-03-23 RX ADMIN — PROPOFOL 100 MG: 10 INJECTION, EMULSION INTRAVENOUS at 10:31

## 2022-03-23 RX ADMIN — FENTANYL CITRATE 150 MCG: 50 INJECTION, SOLUTION INTRAMUSCULAR; INTRAVENOUS at 10:28

## 2022-03-23 RX ADMIN — HYDROCODONE BITARTRATE AND ACETAMINOPHEN 1 TABLET: 5; 325 TABLET ORAL at 12:55

## 2022-03-23 RX ADMIN — DEXAMETHASONE SODIUM PHOSPHATE 8 MG: 10 INJECTION, SOLUTION INTRAMUSCULAR; INTRAVENOUS at 11:08

## 2022-03-23 NOTE — OP NOTE
OPERATIVE REPORT  PATIENT NAME: Reg Vann    :  1970  MRN: [id number]  Pt Location: [facility] OR ROOM 02    SURGERY DATE: 3/23/2022    Surgeon(s) and Role:     * Martinez Garcia MD - Primary     * Wallace Kimble PA-C - Assisting    Preop Diagnosis:  Biliary dyskinesia [K82 8]    Post-Op Diagnosis Codes:     * Biliary dyskinesia [K82 8]    Procedure(s) (LRB):  CHOLECYSTECTOMY LAPAROSCOPIC (N/A)    Specimen(s):  ID Type Source Tests Collected by Time Destination   1 : gallbladder and contents Tissue Gallbladder TISSUE EXAM Martinez Garcia MD 3/23/2022 1110        Estimated Blood Loss:   Minimal    Drains:  NG/OG/Enteral Tube Orogastric 18 Fr Center mouth (Active)   Number of days: 0       Anesthesia Type:   General    Operative Indications:  Biliary dyskinesia [K82 8]  Right upper quadrant pain    Operative Findings:  Distended gallbladder  6mm fat containing umbilical hernia - closed primarily    Complications:   None    Procedure and Technique:    The patient was brought to the operating room and placed in supine position  The patient was sedated and intubated and preoperative antibiotics were given  The abdomen was prepped and draped in the usual sterile fashion  A time-out was carried out and initiated by myself and confirmed the correct patient, procedure, antibiotics any additional concerns  In the infra-umbilical position a combination of 1% lidocaine and 0 25% Marcaine was instilled  An 11 blade was used to make a 10 mm incision  This was carried down to the fascia and a Stefan Milder was used to grasp the umbilical stalk  The fascia was incised in the midline using the 11 blade  A 0 Vicryl on a UR6 needle was used to place a Duarte port and the abdomen was insufflated  Additional 5 mm ports were placed in the subxiphoid and right subcostal positions  The gallbladder was slightly enlarged and distended  The gallbladder was grasped with a locking grasper and retracted cephalad     The gallbladder was grasped and the triangle of calot was dissected using a maryland   Using a maryland - the critical view was obtained  Clips were used to clip and then ligate the duct and artery  The gallbladder was then taken off of the gallbladder fossa using the hook electrocautery  The gallbladder was placed in an Endo-Catch bag and removed from the abdomen  Under direct vision the 5 mm ports were removed and the abdomen was desufflated  The Rogelio port was then also removed and 0 vicryl was used to close the umbilical hernia primarily with interrupted sutures  A 4 0 Monocryl was used to close the skin of all of the ports  Steri strips were placed over the incisions  The patient was then awakened and transported over to the stretcher and to PACU       I was present for the entire procedure, A qualified resident physician was not available and A physician assistant was required during the procedure for retraction tissue handling,dissection and suturing    Patient Disposition:  PACU  and extubated and stable      SIGNATURE: Logan Guzman MD  DATE: March 23, 2022  TIME: 11:23 AM

## 2022-03-23 NOTE — ANESTHESIA POSTPROCEDURE EVALUATION
Post-Op Assessment Note    CV Status:  Stable  Pain Score: 2    Pain management: adequate     Mental Status:  Alert and awake   Hydration Status:  Euvolemic   PONV Controlled:  Controlled   Airway Patency:  Patent  Airway: intubated   Two or more mitigation strategies used for obstructive sleep apnea   Post Op Vitals Reviewed: Yes      Staff: CRNA         No complications documented      BP   121/65   Temp 97 6   Pulse 86   Resp 17   SpO2 98

## 2022-03-23 NOTE — INTERVAL H&P NOTE
H&P reviewed  After examining the patient I find no changes in the patients condition since the H&P had been written      Vitals:    03/23/22 0850   BP: 160/98   Pulse: 77   Resp: 15   Temp: (!) 97 2 °F (36 2 °C)   SpO2: 99%

## 2022-03-23 NOTE — DISCHARGE INSTRUCTIONS
Laparoscopic Cholecystectomy   AMBULATORY CARE:   What you need to know about a laparoscopic cholecystectomy:  Laparoscopic cholecystectomy is surgery to remove gallstones and your gallbladder  What will happen during surgery:   · Your surgeon will make between 1 and 4 small incisions in your abdomen or belly button  He or she will insert small tools into the incisions  Your abdomen will be filled with carbon dioxide gas to make it swell  This helps your surgeon see your organs better and gives more room to move the tools around  · Your surgeon will look for and remove gallstones in and around your gallbladder  X-rays or an ultrasound may be used  Your surgeon will remove your gallbladder through one of the incisions  The carbon dioxide will be released from your abdomen  The incisions will be closed with stitches, medical glue, or adhesive strips, then covered with bandages  What to expect after surgery: You will be taken to a recovery room until you are fully awake  Healthcare providers will monitor you closely for any problems  Providers will help you walk around to prevent blood clots  You may be able to go home later the same day, or you may stay in the hospital overnight  · Pain, a sore throat, nausea, and vomiting are common after this surgery  These should get better within a few days  You may also have diarrhea that lasts up to a few months  · Medicines may be given to prevent or treat pain, nausea, and vomiting  Medicines may also be given to prevent a bacterial infection  Blood thinners may be given to prevent blood clots  You may be bleed or bruise more easily while you are taking blood thinners  · Your surgeon will tell you when to remove the bandages covering the surgery area  He or she will tell you when it is okay to start bathing  You may need to take showers instead of baths for a few days      · Your surgeon will tell you when you can drive, return to work, and do your regular daily activities  · You will be shown how to care for the surgery area and check for signs of infection  You will also be told which foods to eat in the days and weeks after surgery  Risks of a laparoscopic cholecystectomy:   · You could bleed more than expected or get an infection  Any carbon dioxide gas still in your body can cause neck and shoulder pain  Your gallbladder may leak bile into your abdomen during or after surgery  This can cause a severe infection or an abscess  · You may still have gallstones after surgery  You may need a different procedure to remove them  Your surgeon may need to make a larger incision than expected during surgery  Your bile duct, bowel, or other organs could be damaged during surgery  This can be life-threatening  Call your local emergency number (911 in the [address] [facility]) if:   · You feel lightheaded, short of breath, and have chest pain  · You cough up blood  Seek care immediately if:   · Your arm or leg feels warm, tender, and painful  It may look swollen and red  · You cannot stop vomiting  · Your bowel movements are black or bloody  · You have pain in your abdomen and it is swollen or hard  · You have a fever over 101°F (38°C) or chills  Call your doctor or surgeon if:   · You have pain or nausea that is not relieved by medicine  · You have redness and swelling around your incision sites  · You have blood or pus leaking from your incision sites  · You are constipated, have diarrhea, or your bowel movements are pale  · Your skin or eyes are yellow  · You have questions or concerns about your surgery, condition, or care  Medicines: You may need any of the following:  · Prescription pain medicine  may be given  Ask your healthcare provider how to take this medicine safely  Some prescription pain medicines contain acetaminophen  Do not take other medicines that contain acetaminophen without talking to your healthcare provider   Too much acetaminophen may cause liver damage  Prescription pain medicine may cause constipation  Ask your healthcare provider how to prevent or treat constipation  · NSAIDs  help decrease swelling and pain or fever  This medicine is available with or without a doctor's order  NSAIDs can cause stomach bleeding or kidney problems in certain people  If you take blood thinner medicine, always ask your healthcare provider if NSAIDs are safe for you  Always read the medicine label and follow directions  · Take your medicine as directed  Contact your healthcare provider if you think your medicine is not helping or if you have side effects  Tell him or her if you are allergic to any medicine  Keep a list of the medicines, vitamins, and herbs you take  Include the amounts, and when and why you take them  Bring the list or the pill bottles to follow-up visits  Carry your medicine list with you in case of an emergency  Take deep breaths and cough 10 times each hour: This will decrease your risk for a lung infection  Take a deep breath and hold it for as long as you can  Then let the air out and cough strongly  You may be given an incentive spirometer to help you take deep breaths  Put the plastic piece in your mouth and take a slow, deep breath  Then let the air out and cough  Repeat these steps 10 times every hour  Care for the surgery area:   · Remove the bandages as directed  Your surgeon may tell you to remove the bandages the day after surgery  · Keep the area clean and dry  You may take a shower the day after your surgery  Do not take baths, swim, or soak in a hot tub until your surgeon says it is okay  · Check for signs of infection each day  Check the area for swelling, red streaks, or pus  Tell your surgeon right away if you see any of these  · Hug a pillow against the surgery area before you sneeze or cough  This will help prevent pain and protect the surgery area      What to eat after surgery:   · Eat low-fat foods for 4 to 6 weeks  while your body learns to digest fat without a gallbladder  Slowly increase the amount of fat that you eat  · Drink more liquids  Ask how much liquid to drink and which liquids are best for you  When to return to work and other activities:   · Rest often  and slowly increase your activity level each day  If an activity causes pain, wait several days before you do that activity again  · Do not drive  for the first 24 hours after surgery  Your surgeon will tell you when it is okay to drive after the first 24 hours  This is usually after you have stopped taking narcotic pain medicine for a few days  · Do not lift anything heavier than 10 pounds  for 4 to 6 weeks, or as directed  · You may return to work or other activities  as soon as your pain is controlled and you feel comfortable  This is usually 5 to 7 days after surgery  Follow up with your doctor or surgeon as directed:  Write down your questions so you remember to ask them during your visits  © Copyright [employer] 2022 Information is for End User's use only and may not be sold, redistributed or otherwise used for commercial purposes  All illustrations and images included in CareNotes® are the copyrighted property of [employer]  or [facility] [employer]   The above information is an  only  It is not intended as medical advice for individual conditions or treatments  Talk to your doctor, nurse or pharmacist before following any medical regimen to see if it is safe and effective for you

## 2022-04-05 ENCOUNTER — TELEPHONE (OUTPATIENT)
Dept: SURGERY | Facility: CLINIC | Age: 52
End: 2022-04-05

## 2022-04-05 NOTE — TELEPHONE ENCOUNTER
3/28, 4/1 & 4/5  Called , left DETAILED msg for pt    this is first post operative care appt that was cancelled from 4/12 due to dr being out entire week      Also, Called [phone]  Twice today 4/5 to try and get in touch with patient

## 2025-01-20 ENCOUNTER — TELEPHONE (OUTPATIENT)
Dept: GASTROENTEROLOGY | Facility: CLINIC | Age: 55
End: 2025-01-20

## (undated) DEVICE — CHLORAPREP HI-LITE 26ML ORANGE

## (undated) DEVICE — ENDOPATH 5MM CURVED SCISSORS WITH MONOPOLAR CAUTERY: Brand: ENDOPATH

## (undated) DEVICE — LIGHT HANDLE COVER SLEEVE DISP BLUE STELLAR

## (undated) DEVICE — 3M™ STERI-STRIP™ REINFORCED ADHESIVE SKIN CLOSURES, R1547, 1/2 IN X 4 IN (12 MM X 100 MM), 6 STRIPS/ENVELOPE: Brand: 3M™ STERI-STRIP™

## (undated) DEVICE — LIGAMAX 5 MM ENDOSCOPIC MULTIPLE CLIP APPLIER: Brand: LIGAMAX

## (undated) DEVICE — 3M™ TEGADERM™ TRANSPARENT FILM DRESSING FRAME STYLE, 1624W, 2-3/8 IN X 2-3/4 IN (6 CM X 7 CM), 100/CT 4CT/CASE: Brand: 3M™ TEGADERM™

## (undated) DEVICE — SCD SEQUENTIAL COMPRESSION COMFORT SLEEVE MEDIUM KNEE LENGTH: Brand: KENDALL SCD

## (undated) DEVICE — TUBING SMOKE EVAC W/FILTRATION DEVICE PLUMEPORT ACTIV

## (undated) DEVICE — TROCAR: Brand: KII FIOS FIRST ENTRY

## (undated) DEVICE — ELECTRODE LAP L WIRE E-Z CLEAN 33CM -0100

## (undated) DEVICE — TROCAR: Brand: KII® SLEEVE

## (undated) DEVICE — SUT VICRYL 0 UR-6 27 IN J603H

## (undated) DEVICE — GLOVE SRG BIOGEL 7

## (undated) DEVICE — GAUZE SPONGES,8 PLY: Brand: CURITY

## (undated) DEVICE — 3M™ STERI-STRIP™ REINFORCED ADHESIVE SKIN CLOSURES, R1546, 1/4 IN X 4 IN (6 MM X 100 MM), 10 STRIPS/ENVELOPE: Brand: 3M™ STERI-STRIP™

## (undated) DEVICE — TROCARS: Brand: KII® BALLOON BLUNT TIP SYSTEM

## (undated) DEVICE — ALLENTOWN LAP CHOLE APP PACK: Brand: CARDINAL HEALTH

## (undated) DEVICE — TISSUE RETRIEVAL SYSTEM: Brand: INZII RETRIEVAL SYSTEM

## (undated) DEVICE — PENCIL ELECTROSURG E-Z CLEAN -0035H

## (undated) DEVICE — PAD GROUNDING ADULT

## (undated) DEVICE — SUT MONOCRYL 4-0 PS-2 18 IN Y496G

## (undated) DEVICE — DRAPE EQUIPMENT RF WAND

## (undated) DEVICE — [HIGH FLOW INSUFFLATOR,  DO NOT USE IF PACKAGE IS DAMAGED,  KEEP DRY,  KEEP AWAY FROM SUNLIGHT,  PROTECT FROM HEAT AND RADIOACTIVE SOURCES.]: Brand: PNEUMOSURE

## (undated) DEVICE — HYDROPHILIC WOUND DRESSING WITH ZINC PLUS VITAMINS A AND B6.: Brand: DERMAGRAN®-B